# Patient Record
Sex: FEMALE | Race: WHITE | NOT HISPANIC OR LATINO | URBAN - METROPOLITAN AREA
[De-identification: names, ages, dates, MRNs, and addresses within clinical notes are randomized per-mention and may not be internally consistent; named-entity substitution may affect disease eponyms.]

---

## 2017-03-22 ENCOUNTER — CONVERSION ENCOUNTER (OUTPATIENT)
Dept: MAMMOGRAPHY | Facility: HOSPITAL | Age: 77
End: 2017-03-22

## 2020-02-20 ENCOUNTER — HOSPITAL ENCOUNTER (OUTPATIENT)
Dept: SURGERY | Facility: HOSPITAL | Age: 80
Setting detail: HOSPITAL OUTPATIENT SURGERY
Discharge: HOME | End: 2020-02-20
Attending: OPHTHALMOLOGY

## 2020-02-27 ENCOUNTER — HOSPITAL ENCOUNTER (OUTPATIENT)
Dept: SURGERY | Facility: HOSPITAL | Age: 80
Setting detail: HOSPITAL OUTPATIENT SURGERY
Discharge: HOME | End: 2020-02-27
Attending: OPHTHALMOLOGY

## 2024-03-27 ENCOUNTER — OFFICE VISIT (OUTPATIENT)
Dept: NEUROSURGERY | Facility: CLINIC | Age: 84
End: 2024-03-27
Payer: MEDICARE

## 2024-03-27 VITALS
BODY MASS INDEX: 24.53 KG/M2 | SYSTOLIC BLOOD PRESSURE: 140 MMHG | WEIGHT: 147.2 LBS | HEIGHT: 65 IN | DIASTOLIC BLOOD PRESSURE: 86 MMHG

## 2024-03-27 DIAGNOSIS — M51.26 LUMBAR DISC HERNIATION: Primary | ICD-10-CM

## 2024-03-27 DIAGNOSIS — M47.27 OSTEOARTHRITIS OF SPINE WITH RADICULOPATHY, LUMBOSACRAL REGION: ICD-10-CM

## 2024-03-27 RX ORDER — UBIDECARENONE 200 MG
200 CAPSULE ORAL DAILY
COMMUNITY

## 2024-03-27 RX ORDER — BENZONATATE 100 MG/1
100 CAPSULE ORAL 2 TIMES DAILY PRN
COMMUNITY
Start: 2023-11-22

## 2024-03-27 RX ORDER — ATORVASTATIN CALCIUM 40 MG/1
1 TABLET, FILM COATED ORAL
COMMUNITY
Start: 2024-02-05

## 2024-03-27 RX ORDER — LEVOTHYROXINE SODIUM 0.05 MG/1
1 TABLET ORAL DAILY
COMMUNITY
Start: 2023-12-11

## 2024-03-27 RX ORDER — AMLODIPINE BESYLATE 5 MG/1
1 TABLET ORAL DAILY
COMMUNITY
Start: 2024-02-05

## 2024-03-27 RX ORDER — ATENOLOL 25 MG/1
25 TABLET ORAL DAILY
COMMUNITY
Start: 2023-10-23

## 2024-03-27 RX ORDER — ASPIRIN 81 MG/1
81 TABLET ORAL DAILY
COMMUNITY

## 2024-03-27 RX ORDER — ACETAMINOPHEN AND CODEINE PHOSPHATE 300; 30 MG/1; MG/1
1-2 TABLET ORAL EVERY 6 HOURS PRN
COMMUNITY
Start: 2024-02-27

## 2024-03-27 RX ORDER — OMEPRAZOLE 20 MG/1
20 CAPSULE, DELAYED RELEASE ORAL DAILY
COMMUNITY
Start: 2024-02-28

## 2024-03-27 NOTE — PROGRESS NOTES
Chief Complaint  Back Pain    Subjective          Lillian Saucedo who is a 84 y.o. year old female who presents to [facility] for evaluation of lumbar spine.      The patient complains of pain located in the lumbar spine.  Patients states the pain has been present for 2 months.  The pain came on acutely. She does not recall a specific injury that preceded this, possibly lifting a grandson. Pain became quite severe, to the point that she could not get out of the bed. Pain primarily in the right hip and buttock down the leg. Her daughter advised her to go to the ED, she declined. Was given steroids. Symptoms did not improve. She went to the ED. Ultimately had a MRI Lumbar Spine, demonstrating a right sided disc protrusion at L4/5. She was given Tylenol #3 for her pain which provides her benefit.     The pain scale level is 3-4.  The pain does radiate. Dermatomes are located on right Lumbar at: L5..  The pain is waxing/waning and described as sharp, aching, and tingling .  The pain is worse at no particular time of day. Patient states prolonged standing and prolonged walking makes the pain worse.  Patient states rest and pain medication makes the pain better.    Associated Symptoms Include: Numbness, Tingling, and Weakness. Improving. She is utilizing a cane for stabilization.     Conservative Interventions Include: Oral Steroids that were not very effective. and Pain Medications that were somewhat effective.    Was this the result of an injury or accident? : No    History of Previous Spinal Surgery?: No    Nicotine use: non-smoker    BMI: Body mass index is 24.5 kg/m².      Review of Systems   Musculoskeletal:  Positive for arthralgias, back pain, gait problem and myalgias.   Neurological:  Positive for weakness and numbness.   All other systems reviewed and are negative.       Objective   Vital Signs:   /86 (BP Location: Left arm, Patient Position: Sitting)   Ht  "165.1 cm (65\")   Wt 66.8 kg (147 lb 3.2 oz)   BMI 24.50 kg/m²       Physical Exam  Vitals reviewed.   Constitutional:       Appearance: Normal appearance.   Musculoskeletal:      Lumbar back: No tenderness. Negative right straight leg raise test and negative left straight leg raise test.      Right hip: No tenderness. Normal range of motion.      Left hip: No tenderness. Normal range of motion.   Neurological:      Mental Status: She is alert and oriented to person, place, and time.      Motor: Motor strength is normal.     Gait: Gait is intact.      Deep Tendon Reflexes:      Reflex Scores:       Patellar reflexes are 0 on the right side and 2+ on the left side.       Achilles reflexes are 0 on the right side and 2+ on the left side.       Neurologic Exam     Mental Status   Oriented to person, place, and time.   Level of consciousness: alert    Motor Exam   Muscle bulk: normal  Overall muscle tone: normal    Strength   Strength 5/5 throughout.   Right peroneal: 4/5  Right gastroc: 4/5    Sensory Exam   Sensory deficit distribution on right: L5    Gait, Coordination, and Reflexes     Gait  Gait: normal    Reflexes   Right patellar: 0  Left patellar: 2+  Right achilles: 0  Left achilles: 2+  Right ankle clonus: absent  Left ankle clonus: absent       Result Review :       Data reviewed : Radiologic studies MRI Lumbar Spine on 2/21/24 at [facility] personally reviewed. Multilevel degenerative changes. At L4/5 there is a right sided disc protrusion, resulting in severe right foraminal stenosis, mild spinal stenosis.            Assessment and Plan    Diagnoses and all orders for this visit:    1. Lumbar disc herniation (Primary)  -     Ambulatory Referral to Physical Therapy Evaluate and treat; Heat, Electrotherapy; Moist heat; Tens (Home), E-stim; Cross Fiber; Stretching, ROM, Strengthening    2. Osteoarthritis of spine with radiculopathy, lumbosacral region  -     Ambulatory Referral to Physical Therapy Evaluate and " treat; Heat, Electrotherapy; Moist heat; Tens (Home), E-stim; Cross Fiber; Stretching, ROM, Strengthening    Pt presenting for evaluation of acute right sided low back and leg pain with leg weakness starting two months ago. We reviewed her MRI Lumbar Spine, demonstrating multilevel spondylosis with a right sided disc protrusion at L4/5. Symptoms are improving with time. She has notable weakness on exam. Will refer to physical therapy for strengthening. Plan to follow up in 6 weeks to re-evaluate. She is aware to call if pain worsens in the interim.       Follow Up   Return in about 6 weeks (around 5/8/2024).  Patient was given instructions and counseling regarding her condition or for health maintenance advice.

## 2024-04-09 ENCOUNTER — TREATMENT (OUTPATIENT)
Dept: PHYSICAL THERAPY | Facility: CLINIC | Age: 84
End: 2024-04-09
Payer: MEDICARE

## 2024-04-09 DIAGNOSIS — M79.604 RIGHT LEG PAIN: ICD-10-CM

## 2024-04-09 DIAGNOSIS — R29.898 RIGHT LEG WEAKNESS: ICD-10-CM

## 2024-04-09 DIAGNOSIS — M54.50 LUMBAR PAIN: Primary | ICD-10-CM

## 2024-04-09 PROCEDURE — 97161 PT EVAL LOW COMPLEX 20 MIN: CPT | Performed by: PHYSICAL THERAPIST

## 2024-04-09 PROCEDURE — 97110 THERAPEUTIC EXERCISES: CPT | Performed by: PHYSICAL THERAPIST

## 2024-04-09 NOTE — PROGRESS NOTES
"Physical Therapy Initial Evaluation and Plan of Care      Patient: Lillian Saucedo   : 1940  Diagnosis/ICD-10 Code:  Lumbar pain [M54.50]  Referring practitioner: Selene Quinn*  Date of Initial Visit: 2024  Today's Date: 2024  Patient seen for 1 sessions         Visit Diagnoses:    ICD-10-CM ICD-9-CM   1. Lumbar pain  M54.50 724.2   2. Right leg weakness  R29.898 729.89   3. Right leg pain  M79.604 729.5         Subjective Evaluation    History of Present Illness  Mechanism of injury: Mrs. Saucedo comes to [facility] with complaints of LBP.  She is unsure of what started the pain, but it started back in January, but has eased up some.  Origininally, the pain was very severe and she could not get out of bed.  She would not go to the ER immediately, but did end up going.  She was using a walker, but is now using a cane.     She has increased pain and radicular symptoms down the RLE.  The LLE does't really bother her too much unless she overworks it.  She does have weakness throughout the RLE.  She has been walking around the house some without the cane the last few days, but if she stands too long, it can hurt.  If she goes anywhere, she takes the cane.  She doesn't want to fall.  She has her  going up and down and the steps for laundry.  She notes it hurts to drive, RLE feels weak.        She is only taking sponge baths, not stepping over her bath.   She cannot sleep on the R side, but she is sleeping in the bed.     See lumbar MRI 2024.      She has seen APRN with neuro, referred for PT.  She doesn't want to have surgery at her age.             Pain  Current pain ratin  Quality: sharp, dull ache and radiating (tingling, \"feels like water dropping off the leg\")  Relieving factors: rest and medications (Tylenol 3)  Aggravating factors: prolonged positioning, ambulation and standing    Social Support  Lives in: multiple-level home  Lives with: spouse    Hand dominance: " right    Patient Goals  Patient goals for therapy: decreased pain, improved balance, increased motion, increased strength, independence with ADLs/IADLs and return to sport/leisure activities  Patient goal: drive, do steps, clean, laundry           Objective          Active Range of Motion     Additional Active Range of Motion Details  LUMBAR  Flexion: to knees, tightness in HS, no lumbar pain  Extension: to neutral, no pain  R lateral flexion:  to 20 degrees, increases R leg/lumbar pain  L lateral flexion:  to 30, no pain    Radicular symptoms: down the RLE, none at this time, sometimes it feels like water is running down the leg    Tenderness: R QL, lumbar paraspinals, B hip flexors    Posture: L anterior pelvic rotation    Strength/Myotome Testing     Left Hip   Planes of Motion   Flexion: 4-  Abduction: 4-  Adduction: 4    Right Hip   Planes of Motion   Flexion: 3+  Abduction: 4-  Adduction: 4-    Left Knee   Flexion: 4  Extension: 4    Right Knee   Flexion: 4-  Extension: 4-    Left Ankle/Foot   Dorsiflexion: 4  Plantar flexion: 4    Right Ankle/Foot   Dorsiflexion: 3+  Plantar flexion: 4-    Tests       Thoracic   Positive slump.     Lumbar     Right   Positive crossed SLR, passive SLR and quadrant.           Assessment & Plan       Assessment  Impairments: abnormal gait, abnormal or restricted ROM, activity intolerance, impaired balance, impaired physical strength, lacks appropriate home exercise program, pain with function, safety issue and weight-bearing intolerance   Assessment details: Pt presents with limitations, noted below, that impede her ability to perform ADLs, lifting, walking short distances, sitting periods of time, standing long periods, driving, sleeping, and social activities. The skills of a therapist will be required to safely and effectively implement the following treatment plan to restore maximal level of function.        Goals  Plan Goals: LOW BACK PROBLEMS:    1. The patient complains  of low back pain.  LTG 1: 12 weeks:  The patient will report a pain rating of 2/10 or better in order to improve tolerance to activities of daily living and improve sleep quality.  STATUS:  New  STG 1a: 4 weeks:  The patient will report a pain rating of 4/10 or better.  STATUS:  New  TREATMENT:  Therapeutic exercises, manual therapy, aquatic therapy, home exercise instruction, and modalities as needed for pain to include:  electrical stimulation, moist heat, ice, ultrasound, and diathermy.      2. The patient demonstrates weakness of the right hip.  LTG 2: 12 weeks:  The patient will demonstrate 4+ /5 strength for right hip flexion, abduction, and extension in order to improve hip stability.  STATUS:  New  STG 2a: 4 weeks:  The patient will demonstrate 4 /5 strength for right hip flexion, abduction, and extension.  STATUS:  New  STG2b:  4 weeks:  The patient will be independent with home exercises.     STATUS:  New  TREATMENT: Therapeutic exercises, manual therapy, aquatic therapy, home exercise instruction, and modalities as needed for pain to include:  electrical stimulation, moist heat, ice, ultrasound, and diathermy.      3. The patient has gait dysfunction.  LTG 3: 12 weeks:  The patient will ambulate without assistive device, independently, for community distances with minimal limp to the right lower extremity in order to improve mobility and allow patient to perform activities such as grocery shopping with greater ease.  STATUS:  New  TREATMENT: Gait training, aquatic therapy, therapeutic exercise, and home exercise instruction.      4. Mobility: Walking/Moving Around Functional Limitation    LTG 4: 12 weeks:  The patient will demonstrate decreased limitation by achieving a score of 7/45 on the EDVIN.  STATUS:  New  STG 4 a: 4 weeks:  The patient will demonstrate decreased limitation by achieving a score of 12/45 on the EDVIN.    STATUS:  New  TREATMENT:  Manual therapy, therapeutic exercise, home exercise  instruction, and modalities as needed.      5. The patient reports radicular symptoms in the right lower extremity.   LTG 5: 12 weeks:  The patient will report a decrease in radicular symptoms in the right lower extremity by 75%.    STATUS:  New   STG 3a: 4 weeks:  The patient will report a decrease in radicular symptoms in the right lower extremity by 50%.    STATUS:  New   TREATMENT: Manual therapy, therapeutic exercise, home exercise instruction, lumbar traction, and modalities as needed to include: moist heat, electrical stimulation, and ultrasound.    Plan  Therapy options: will be seen for skilled therapy services  Planned modality interventions: cryotherapy, thermotherapy (hydrocollator packs), dry needling, TENS, traction, ultrasound and electrical stimulation/Russian stimulation  Planned therapy interventions: abdominal trunk stabilization, manual therapy, flexibility, functional ROM exercises, gait training, home exercise program, therapeutic activities, stretching, strengthening, spinal/joint mobilization, soft tissue mobilization, postural training, neuromuscular re-education, body mechanics training, ADL retraining, balance/weight-bearing training, motor coordination training and joint mobilization  Frequency: 2x week  Duration in weeks: 12  Treatment plan discussed with: patient  Plan details: Review HEP, update as needed.    Progress with lower back/core strength, trunk control/postural awareness, BLE strengthening, decreased RLE radicular symptoms, increased stamina, decreased tightness, improved ROM/flexibility, education as needed, gait/balance.             History # of Personal Factors and/or Comorbidities: MODERATE (1-2)  Examination of Body System(s): # of elements: MODERATE (3)  Clinical Presentation: STABLE   Clinical Decision Making: LOW     Timed:  Manual Therapy:         mins  30996;  Therapeutic Exercise:    8     mins  88785;     Neuromuscular Ni:        mins  46596;    Therapeutic  Activity:          mins  93398;     Gait Training:           mins  42139;     Ultrasound:          mins  97689;    Canalith Repos           ___  mins  37986      Untimed:  Electrical Stimulation:         mins  48047 ( );  Mechanical Traction:         mins  40841;   Dry Needling:                    mins self pay  Low Eval:                      29     mins  06562;  Medium Eval:                     mins  40429;   High Eval:                          mins  87887       Timed Treatment:   8   mins   Total Treatment:     37   mins    PT SIGNATURE: Lillian Martinez PT, DPT  [place] License: [id number]  Electronically signed by Lillian Martinez PT, 04/09/24, 7:50 AM EDT      Initial Certification    Certification Period: 4/9/2024 thru 7/7/2024  I certify that the therapy services are furnished while this patient is under my care.  The services outlined above are required by this patient, and will be reviewed every 90 days.     PHYSICIAN: Selene Hugo APRN  NPI: [id number]            PHYSICIAN PRINT NAME: ______________________________________________      PHYSICIAN SIGNATURE: ______________________________________________         DATE:________________________________        Please sign and return via fax to [phone].  Thank you, [facility].

## 2024-04-12 ENCOUNTER — TREATMENT (OUTPATIENT)
Dept: PHYSICAL THERAPY | Facility: CLINIC | Age: 84
End: 2024-04-12
Payer: MEDICARE

## 2024-04-12 DIAGNOSIS — M54.50 LUMBAR PAIN: Primary | ICD-10-CM

## 2024-04-12 DIAGNOSIS — R29.898 RIGHT LEG WEAKNESS: ICD-10-CM

## 2024-04-12 DIAGNOSIS — M79.604 RIGHT LEG PAIN: ICD-10-CM

## 2024-04-12 PROCEDURE — 97140 MANUAL THERAPY 1/> REGIONS: CPT | Performed by: PHYSICAL THERAPIST

## 2024-04-12 PROCEDURE — 97110 THERAPEUTIC EXERCISES: CPT | Performed by: PHYSICAL THERAPIST

## 2024-04-12 PROCEDURE — 97530 THERAPEUTIC ACTIVITIES: CPT | Performed by: PHYSICAL THERAPIST

## 2024-04-12 NOTE — PROGRESS NOTES
Physical Therapy Daily Treatment Note      Patient: Lillian Saucedo   : 1940  Referring practitioner: Selene Quinn*  Date of Initial Visit: Type: THERAPY  Noted: 2024  Today's Date: 2024  Patient seen for 2 sessions           Visit Diagnoses:    ICD-10-CM ICD-9-CM   1. Lumbar pain  M54.50 724.2   2. Right leg weakness  R29.898 729.89   3. Right leg pain  M79.604 729.5       Subjective Evaluation    History of Present Illness    Subjective comment: She did ok with the exercises from the HEP.  Her pain is not too much today - she notes she just got up.         Objective   See Exercise, Manual, and Modality Logs for complete treatment.       Assessment & Plan       Assessment  Impairments: abnormal gait, abnormal or restricted ROM, activity intolerance, impaired balance, impaired physical strength, lacks appropriate home exercise program, pain with function, safety issue and weight-bearing intolerance   Assessment details: Reviewed HEP - pt noted patient with PPT, but she was doing them the wrong way.  Corrected her form and she did not have pain during session.  Progressed with lumbar mobility, hip/leg strengthening, and some stamina.  She was fatigued at the end of the session, but did not have pain.          Goals  Plan Goals: LOW BACK PROBLEMS:    1. The patient complains of low back pain.  LTG 1: 12 weeks:  The patient will report a pain rating of 2/10 or better in order to improve tolerance to activities of daily living and improve sleep quality.  STATUS:  Progressing  STG 1a: 4 weeks:  The patient will report a pain rating of 4/10 or better.  STATUS:  Progressing  TREATMENT:  Therapeutic exercises, manual therapy, aquatic therapy, home exercise instruction, and modalities as needed for pain to include:  electrical stimulation, moist heat, ice, ultrasound, and diathermy.      2. The patient demonstrates weakness of the right hip.  LTG 2: 12 weeks:  The patient will demonstrate 4+  /5 strength for right hip flexion, abduction, and extension in order to improve hip stability.  STATUS:  Progressing  STG 2a: 4 weeks:  The patient will demonstrate 4 /5 strength for right hip flexion, abduction, and extension.  STATUS:  Progressing  STG2b:  4 weeks:  The patient will be independent with home exercises.     STATUS:  Progressing  TREATMENT: Therapeutic exercises, manual therapy, aquatic therapy, home exercise instruction, and modalities as needed for pain to include:  electrical stimulation, moist heat, ice, ultrasound, and diathermy.      3. The patient has gait dysfunction.  LTG 3: 12 weeks:  The patient will ambulate without assistive device, independently, for community distances with minimal limp to the right lower extremity in order to improve mobility and allow patient to perform activities such as grocery shopping with greater ease.  STATUS:  Progressing  TREATMENT: Gait training, aquatic therapy, therapeutic exercise, and home exercise instruction.      4. Mobility: Walking/Moving Around Functional Limitation    LTG 4: 12 weeks:  The patient will demonstrate decreased limitation by achieving a score of 7/45 on the EDVIN.  STATUS:  Progressing  STG 4 a: 4 weeks:  The patient will demonstrate decreased limitation by achieving a score of 12/45 on the EDVIN.    STATUS:  Progressing  TREATMENT:  Manual therapy, therapeutic exercise, home exercise instruction, and modalities as needed.      5. The patient reports radicular symptoms in the right lower extremity.   LTG 5: 12 weeks:  The patient will report a decrease in radicular symptoms in the right lower extremity by 75%.    STATUS:  Progressing   STG 3a: 4 weeks:  The patient will report a decrease in radicular symptoms in the right lower extremity by 50%.    STATUS:  Progressing   TREATMENT: Manual therapy, therapeutic exercise, home exercise instruction, lumbar traction, and modalities as needed to include: moist heat, electrical stimulation, and  ultrasound.    Plan  Therapy options: will be seen for skilled therapy services  Planned modality interventions: cryotherapy, thermotherapy (hydrocollator packs), dry needling, TENS, traction, ultrasound and electrical stimulation/Russian stimulation  Planned therapy interventions: abdominal trunk stabilization, manual therapy, flexibility, functional ROM exercises, gait training, home exercise program, therapeutic activities, stretching, strengthening, spinal/joint mobilization, soft tissue mobilization, postural training, neuromuscular re-education, body mechanics training, ADL retraining, balance/weight-bearing training, motor coordination training and joint mobilization  Frequency: 2x week  Duration in weeks: 12  Treatment plan discussed with: patient  Plan details: Progress with lower back/core strength, trunk control/postural awareness, BLE strengthening, decreased RLE radicular symptoms, increased stamina, decreased tightness, improved ROM/flexibility, education as needed, gait/balance.             Progress per Plan of Care and Progress strengthening /stabilization /functional activity           Timed:  Manual Therapy:    8     mins  35961;  Therapeutic Exercise:    17     mins  25137;     Neuromuscular Ni:        mins  52079;    Therapeutic Activity:     13     mins  72422;     Gait Training:           mins  56550;     Ultrasound:          mins  66253;    Canalith Repos           ___  mins  31634      Untimed:  Electrical Stimulation:         mins  44732 ( );  Mechanical Traction:        mins  44772;   Dry Needling:                     mins self pay       Timed Treatment:   38   mins   Total Treatment:     38   mins      Lillian Martinez PT, DPT  [place] License #: [id number]

## 2024-04-17 ENCOUNTER — TREATMENT (OUTPATIENT)
Dept: PHYSICAL THERAPY | Facility: CLINIC | Age: 84
End: 2024-04-17
Payer: MEDICARE

## 2024-04-17 DIAGNOSIS — M54.50 LUMBAR PAIN: Primary | ICD-10-CM

## 2024-04-17 DIAGNOSIS — R29.898 RIGHT LEG WEAKNESS: ICD-10-CM

## 2024-04-17 DIAGNOSIS — M79.604 RIGHT LEG PAIN: ICD-10-CM

## 2024-04-17 PROCEDURE — 97140 MANUAL THERAPY 1/> REGIONS: CPT | Performed by: PHYSICAL THERAPIST

## 2024-04-17 PROCEDURE — 97110 THERAPEUTIC EXERCISES: CPT | Performed by: PHYSICAL THERAPIST

## 2024-04-17 PROCEDURE — 97112 NEUROMUSCULAR REEDUCATION: CPT | Performed by: PHYSICAL THERAPIST

## 2024-04-17 NOTE — PROGRESS NOTES
Physical Therapy Daily Treatment Note      Patient: Lillian Saucedo   : 1940  Referring practitioner: Selene Quinn*  Date of Initial Visit: Type: THERAPY  Noted: 2024  Today's Date: 2024  Patient seen for 3 sessions           Subjective Evaluation    History of Present Illness    Subjective comment: 5/10       Objective   See Exercise, Manual, and Modality Logs for complete treatment.       Assessment & Plan       Assessment  Impairments: abnormal gait, abnormal or restricted ROM, activity intolerance, impaired balance, impaired physical strength, lacks appropriate home exercise program, pain with function, safety issue and weight-bearing intolerance   Assessment details: Pt started new exercises requiring vc and demonstration for safe and effective performance. Core stabilization and strength was the main focus of this tx session. Pt education on core stability and the effects of pelvic instability on the low back and sciatic nerve was given. Numerous areas of soft tissue tightness in the low back and pelvic region were noted. No pain reported at this end of tx.        Goals  Plan Goals: LOW BACK PROBLEMS:    1. The patient complains of low back pain.  LTG 1: 12 weeks:  The patient will report a pain rating of 2/10 or better in order to improve tolerance to activities of daily living and improve sleep quality.  STATUS:  Progressing  STG 1a: 4 weeks:  The patient will report a pain rating of 4/10 or better.  STATUS:  Progressing  TREATMENT:  Therapeutic exercises, manual therapy, aquatic therapy, home exercise instruction, and modalities as needed for pain to include:  electrical stimulation, moist heat, ice, ultrasound, and diathermy.      2. The patient demonstrates weakness of the right hip.  LTG 2: 12 weeks:  The patient will demonstrate 4+ /5 strength for right hip flexion, abduction, and extension in order to improve hip stability.  STATUS:  Progressing  STG 2a: 4 weeks:  The  patient will demonstrate 4 /5 strength for right hip flexion, abduction, and extension.  STATUS:  Progressing  STG2b:  4 weeks:  The patient will be independent with home exercises.     STATUS:  Progressing  TREATMENT: Therapeutic exercises, manual therapy, aquatic therapy, home exercise instruction, and modalities as needed for pain to include:  electrical stimulation, moist heat, ice, ultrasound, and diathermy.      3. The patient has gait dysfunction.  LTG 3: 12 weeks:  The patient will ambulate without assistive device, independently, for community distances with minimal limp to the right lower extremity in order to improve mobility and allow patient to perform activities such as grocery shopping with greater ease.  STATUS:  Progressing  TREATMENT: Gait training, aquatic therapy, therapeutic exercise, and home exercise instruction.      4. Mobility: Walking/Moving Around Functional Limitation    LTG 4: 12 weeks:  The patient will demonstrate decreased limitation by achieving a score of 7/45 on the EDVIN.  STATUS:  Progressing  STG 4 a: 4 weeks:  The patient will demonstrate decreased limitation by achieving a score of 12/45 on the EDIVN.    STATUS:  Progressing  TREATMENT:  Manual therapy, therapeutic exercise, home exercise instruction, and modalities as needed.      5. The patient reports radicular symptoms in the right lower extremity.   LTG 5: 12 weeks:  The patient will report a decrease in radicular symptoms in the right lower extremity by 75%.    STATUS:  Progressing   STG 3a: 4 weeks:  The patient will report a decrease in radicular symptoms in the right lower extremity by 50%.    STATUS:  Progressing   TREATMENT: Manual therapy, therapeutic exercise, home exercise instruction, lumbar traction, and modalities as needed to include: moist heat, electrical stimulation, and ultrasound.    Plan  Therapy options: will be seen for skilled therapy services  Planned modality interventions: cryotherapy, thermotherapy  (hydrocollator packs), dry needling, TENS, traction, ultrasound and electrical stimulation/Russian stimulation  Planned therapy interventions: abdominal trunk stabilization, manual therapy, flexibility, functional ROM exercises, gait training, home exercise program, therapeutic activities, stretching, strengthening, spinal/joint mobilization, soft tissue mobilization, postural training, neuromuscular re-education, body mechanics training, ADL retraining, balance/weight-bearing training, motor coordination training and joint mobilization  Frequency: 2x week  Duration in weeks: 12  Treatment plan discussed with: patient  Plan details: Progress with lower back/core strength, trunk control/postural awareness, BLE strengthening, decreased RLE radicular symptoms, increased stamina, decreased tightness, improved ROM/flexibility, education as needed, gait/balance.             Visit Diagnoses:    ICD-10-CM ICD-9-CM   1. Lumbar pain  M54.50 724.2   2. Right leg weakness  R29.898 729.89   3. Right leg pain  M79.604 729.5       Progress per Plan of Care    Timed:    Therapeutic Exercise:    12     mins  81223;  Manual Therapy:    24     mins  86414;     Neuromuscular Ni:   18    mins  36635;    Therapeutic Activity:          mins  37712;     Gait Training:           mins  81907;     Ultrasound:          mins  78549;      Untimed:  Electrical Stimulation:         mins  88035 ( );  Mechanical Traction:         mins  60401;     Timed Treatment:   54   mins   Total Treatment:     56   mins      Ana Valdez PTA  Physical Therapist Assistant

## 2024-04-19 ENCOUNTER — TREATMENT (OUTPATIENT)
Dept: PHYSICAL THERAPY | Facility: CLINIC | Age: 84
End: 2024-04-19
Payer: MEDICARE

## 2024-04-19 DIAGNOSIS — M79.604 RIGHT LEG PAIN: ICD-10-CM

## 2024-04-19 DIAGNOSIS — M54.50 LUMBAR PAIN: Primary | ICD-10-CM

## 2024-04-19 DIAGNOSIS — R29.898 RIGHT LEG WEAKNESS: ICD-10-CM

## 2024-04-19 PROCEDURE — 97110 THERAPEUTIC EXERCISES: CPT | Performed by: PHYSICAL THERAPIST

## 2024-04-19 PROCEDURE — 97140 MANUAL THERAPY 1/> REGIONS: CPT | Performed by: PHYSICAL THERAPIST

## 2024-04-19 NOTE — PROGRESS NOTES
Physical Therapy Daily Treatment Note      Patient: Lillian Saucedo   : 1940  Referring practitioner: Selene Quinn*  Date of Initial Visit: Type: THERAPY  Noted: 2024  Today's Date: 2024  Patient seen for 4 sessions           Subjective Evaluation    History of Present Illness    Subjective comment: 5/10       Objective   See Exercise, Manual, and Modality Logs for complete treatment.       Assessment & Plan       Assessment  Impairments: abnormal gait, abnormal or restricted ROM, activity intolerance, impaired balance, impaired physical strength, lacks appropriate home exercise program, pain with function, safety issue and weight-bearing intolerance   Assessment details: Pt reviewed the PPT again today and there is still some compensatory muscle usage with the performance. Pt again was educated on the importance of the PPT and to cont her exercises even after DC to allow for maintaining her strength and gains. There is a R pelvic torsion still noted.    Goals  Plan Goals: LOW BACK PROBLEMS:    1. The patient complains of low back pain.  LTG 1: 12 weeks:  The patient will report a pain rating of 2/10 or better in order to improve tolerance to activities of daily living and improve sleep quality.  STATUS:  Progressing  STG 1a: 4 weeks:  The patient will report a pain rating of 4/10 or better.  STATUS:  Progressing  TREATMENT:  Therapeutic exercises, manual therapy, aquatic therapy, home exercise instruction, and modalities as needed for pain to include:  electrical stimulation, moist heat, ice, ultrasound, and diathermy.      2. The patient demonstrates weakness of the right hip.  LTG 2: 12 weeks:  The patient will demonstrate 4+ /5 strength for right hip flexion, abduction, and extension in order to improve hip stability.  STATUS:  Progressing  STG 2a: 4 weeks:  The patient will demonstrate 4 /5 strength for right hip flexion, abduction, and extension.  STATUS:  Progressing  STG2b:  4  weeks:  The patient will be independent with home exercises.     STATUS:  Progressing  TREATMENT: Therapeutic exercises, manual therapy, aquatic therapy, home exercise instruction, and modalities as needed for pain to include:  electrical stimulation, moist heat, ice, ultrasound, and diathermy.      3. The patient has gait dysfunction.  LTG 3: 12 weeks:  The patient will ambulate without assistive device, independently, for community distances with minimal limp to the right lower extremity in order to improve mobility and allow patient to perform activities such as grocery shopping with greater ease.  STATUS:  Progressing  TREATMENT: Gait training, aquatic therapy, therapeutic exercise, and home exercise instruction.      4. Mobility: Walking/Moving Around Functional Limitation    LTG 4: 12 weeks:  The patient will demonstrate decreased limitation by achieving a score of 7/45 on the EDVIN.  STATUS:  Progressing  STG 4 a: 4 weeks:  The patient will demonstrate decreased limitation by achieving a score of 12/45 on the EDVIN.    STATUS:  Progressing  TREATMENT:  Manual therapy, therapeutic exercise, home exercise instruction, and modalities as needed.      5. The patient reports radicular symptoms in the right lower extremity.   LTG 5: 12 weeks:  The patient will report a decrease in radicular symptoms in the right lower extremity by 75%.    STATUS:  Progressing   STG 3a: 4 weeks:  The patient will report a decrease in radicular symptoms in the right lower extremity by 50%.    STATUS:  Progressing   TREATMENT: Manual therapy, therapeutic exercise, home exercise instruction, lumbar traction, and modalities as needed to include: moist heat, electrical stimulation, and ultrasound.    Plan  Therapy options: will be seen for skilled therapy services  Planned modality interventions: cryotherapy, thermotherapy (hydrocollator packs), dry needling, TENS, traction, ultrasound and electrical stimulation/Russian stimulation  Planned  therapy interventions: abdominal trunk stabilization, manual therapy, flexibility, functional ROM exercises, gait training, home exercise program, therapeutic activities, stretching, strengthening, spinal/joint mobilization, soft tissue mobilization, postural training, neuromuscular re-education, body mechanics training, ADL retraining, balance/weight-bearing training, motor coordination training and joint mobilization  Frequency: 2x week  Duration in weeks: 12  Treatment plan discussed with: patient  Plan details: Progress with lower back/core strength, trunk control/postural awareness, BLE strengthening, decreased RLE radicular symptoms, increased stamina, decreased tightness, improved ROM/flexibility, education as needed, gait/balance.           Visit Diagnoses:    ICD-10-CM ICD-9-CM   1. Lumbar pain  M54.50 724.2   2. Right leg weakness  R29.898 729.89   3. Right leg pain  M79.604 729.5       Progress per Plan of Care    Timed:    Therapeutic Exercise:    20     mins  07750;  Manual Therapy:    8     mins  25676;     Neuromuscular Ni:       mins  61626;    Therapeutic Activity:          mins  13025;     Gait Training:           mins  89879;     Ultrasound:          mins  90116;      Untimed:  Electrical Stimulation:         mins  13248 ( );  Mechanical Traction:         mins  34455;     Timed Treatment:   28   mins   Total Treatment:     30   mins      Ana Valdez PTA  Physical Therapist Assistant

## 2024-04-24 ENCOUNTER — TREATMENT (OUTPATIENT)
Dept: PHYSICAL THERAPY | Facility: CLINIC | Age: 84
End: 2024-04-24
Payer: MEDICARE

## 2024-04-24 DIAGNOSIS — M79.604 RIGHT LEG PAIN: ICD-10-CM

## 2024-04-24 DIAGNOSIS — M54.50 LUMBAR PAIN: Primary | ICD-10-CM

## 2024-04-24 DIAGNOSIS — R29.898 RIGHT LEG WEAKNESS: ICD-10-CM

## 2024-04-24 PROCEDURE — 97530 THERAPEUTIC ACTIVITIES: CPT | Performed by: PHYSICAL THERAPIST

## 2024-04-24 PROCEDURE — 97110 THERAPEUTIC EXERCISES: CPT | Performed by: PHYSICAL THERAPIST

## 2024-04-24 NOTE — PROGRESS NOTES
"Physical Therapy Daily Treatment Note      Patient: Lillian Saucedo   : 1940  Referring practitioner: Selene Quinn*  Date of Initial Visit: Type: THERAPY  Noted: 2024  Today's Date: 2024  Patient seen for 5 sessions           Subjective Evaluation    History of Present Illness    Subjective comment: \"If I overdue it my back hurts but otherwise it (pain) is mild.\"       Objective   See Exercise, Manual, and Modality Logs for complete treatment.       Assessment & Plan       Assessment  Impairments: abnormal gait, abnormal or restricted ROM, activity intolerance, impaired balance, impaired physical strength, lacks appropriate home exercise program, pain with function, safety issue and weight-bearing intolerance   Assessment details: Pt reports going up and down stairs at home and being able to tolerate it well. Pt also reports doing her HEP. There cont to be a R pelvic torsion at this visit. Pt required vc to decrease compensatory muscle use with the PPT. There is tightness in the R hip flexion with tenderness upon palpation.    Goals  Plan Goals: LOW BACK PROBLEMS:    1. The patient complains of low back pain.  LTG 1: 12 weeks:  The patient will report a pain rating of 2/10 or better in order to improve tolerance to activities of daily living and improve sleep quality.  STATUS:  Progressing  STG 1a: 4 weeks:  The patient will report a pain rating of 4/10 or better.  STATUS:  Progressing  TREATMENT:  Therapeutic exercises, manual therapy, aquatic therapy, home exercise instruction, and modalities as needed for pain to include:  electrical stimulation, moist heat, ice, ultrasound, and diathermy.      2. The patient demonstrates weakness of the right hip.  LTG 2: 12 weeks:  The patient will demonstrate 4+ /5 strength for right hip flexion, abduction, and extension in order to improve hip stability.  STATUS:  Progressing  STG 2a: 4 weeks:  The patient will demonstrate 4 /5 strength for " right hip flexion, abduction, and extension.  STATUS:  Progressing  STG2b:  4 weeks:  The patient will be independent with home exercises.     STATUS:  Progressing  TREATMENT: Therapeutic exercises, manual therapy, aquatic therapy, home exercise instruction, and modalities as needed for pain to include:  electrical stimulation, moist heat, ice, ultrasound, and diathermy.      3. The patient has gait dysfunction.  LTG 3: 12 weeks:  The patient will ambulate without assistive device, independently, for community distances with minimal limp to the right lower extremity in order to improve mobility and allow patient to perform activities such as grocery shopping with greater ease.  STATUS:  Progressing  TREATMENT: Gait training, aquatic therapy, therapeutic exercise, and home exercise instruction.      4. Mobility: Walking/Moving Around Functional Limitation    LTG 4: 12 weeks:  The patient will demonstrate decreased limitation by achieving a score of 7/45 on the EDVIN.  STATUS:  Progressing  STG 4 a: 4 weeks:  The patient will demonstrate decreased limitation by achieving a score of 12/45 on the EDVIN.    STATUS:  Progressing  TREATMENT:  Manual therapy, therapeutic exercise, home exercise instruction, and modalities as needed.      5. The patient reports radicular symptoms in the right lower extremity.   LTG 5: 12 weeks:  The patient will report a decrease in radicular symptoms in the right lower extremity by 75%.    STATUS:  Progressing   STG 3a: 4 weeks:  The patient will report a decrease in radicular symptoms in the right lower extremity by 50%.    STATUS:  Progressing   TREATMENT: Manual therapy, therapeutic exercise, home exercise instruction, lumbar traction, and modalities as needed to include: moist heat, electrical stimulation, and ultrasound.    Plan  Therapy options: will be seen for skilled therapy services  Planned modality interventions: cryotherapy, thermotherapy (hydrocollator packs), dry needling, TENS,  traction, ultrasound and electrical stimulation/Russian stimulation  Planned therapy interventions: abdominal trunk stabilization, manual therapy, flexibility, functional ROM exercises, gait training, home exercise program, therapeutic activities, stretching, strengthening, spinal/joint mobilization, soft tissue mobilization, postural training, neuromuscular re-education, body mechanics training, ADL retraining, balance/weight-bearing training, motor coordination training and joint mobilization  Frequency: 2x week  Duration in weeks: 12  Treatment plan discussed with: patient  Plan details: Progress with lower back/core strength, trunk control/postural awareness, BLE strengthening, decreased RLE radicular symptoms, increased stamina, decreased tightness, improved ROM/flexibility, education as needed, gait/balance.             Visit Diagnoses:    ICD-10-CM ICD-9-CM   1. Lumbar pain  M54.50 724.2   2. Right leg weakness  R29.898 729.89   3. Right leg pain  M79.604 729.5       Progress per Plan of Care    Timed:    Therapeutic Exercise:    10     mins  72963;  Manual Therapy:    8     mins  16408;     Neuromuscular Ni:       mins  52161;    Therapeutic Activity:     12     mins  45789;     Gait Training:           mins  29033;     Ultrasound:          mins  57855;      Untimed:  Electrical Stimulation:         mins  89208 ( );  Mechanical Traction:         mins  80814;     Timed Treatment:   30   mins   Total Treatment:     32   mins      Ana Valdez PTA  Physical Therapist Assistant

## 2024-04-30 ENCOUNTER — TREATMENT (OUTPATIENT)
Dept: PHYSICAL THERAPY | Facility: CLINIC | Age: 84
End: 2024-04-30
Payer: MEDICARE

## 2024-04-30 DIAGNOSIS — M79.604 RIGHT LEG PAIN: ICD-10-CM

## 2024-04-30 DIAGNOSIS — R29.898 RIGHT LEG WEAKNESS: ICD-10-CM

## 2024-04-30 DIAGNOSIS — M54.50 LUMBAR PAIN: Primary | ICD-10-CM

## 2024-04-30 PROCEDURE — 97110 THERAPEUTIC EXERCISES: CPT | Performed by: PHYSICAL THERAPIST

## 2024-04-30 PROCEDURE — 97530 THERAPEUTIC ACTIVITIES: CPT | Performed by: PHYSICAL THERAPIST

## 2024-04-30 NOTE — PROGRESS NOTES
Physical Therapy Daily Treatment Note      Patient: Lillian Saucedo   : 1940  Referring practitioner: Selene Quinn*  Date of Initial Visit: Type: THERAPY  Noted: 2024  Today's Date: 2024  Patient seen for 6 sessions           Visit Diagnoses:    ICD-10-CM ICD-9-CM   1. Lumbar pain  M54.50 724.2   2. Right leg weakness  R29.898 729.89   3. Right leg pain  M79.604 729.5       Subjective Evaluation    History of Present Illness  Mechanism of injury: Mrs. Saucedo has had increased pain in the back and R leg since last week.  Her  notes that she had R lower leg swelling last night, but went away this morning.     The pain has not let us.  It is sporadic throughout the R leg - quad, IT band, and calf.  It doesn't feel like her prior sciatica like pain, this is different she notes.                Objective   See Exercise, Manual, and Modality Logs for complete treatment.       Assessment & Plan       Assessment  Impairments: abnormal gait, abnormal or restricted ROM, activity intolerance, impaired balance, impaired physical strength, lacks appropriate home exercise program, pain with function, safety issue and weight-bearing intolerance   Assessment details: Increased soreness and tender to touch along the R quad, thigh, and lower back.  She does have an anteriorly rotated pelvis on the R.  Added in very gentle stretches.  Poor tolerance to manual along the R leg today.  Her pain is sporadic throughout the R leg throughout the session - mainly in the R hip flexor/quad region and the R calf/shin.    Goals  Plan Goals: LOW BACK PROBLEMS:    1. The patient complains of low back pain.  LTG 1: 12 weeks:  The patient will report a pain rating of 2/10 or better in order to improve tolerance to activities of daily living and improve sleep quality.  STATUS:  Progressing  STG 1a: 4 weeks:  The patient will report a pain rating of 4/10 or better.  STATUS:  Progressing  TREATMENT:  Therapeutic  exercises, manual therapy, aquatic therapy, home exercise instruction, and modalities as needed for pain to include:  electrical stimulation, moist heat, ice, ultrasound, and diathermy.      2. The patient demonstrates weakness of the right hip.  LTG 2: 12 weeks:  The patient will demonstrate 4+ /5 strength for right hip flexion, abduction, and extension in order to improve hip stability.  STATUS:  Progressing  STG 2a: 4 weeks:  The patient will demonstrate 4 /5 strength for right hip flexion, abduction, and extension.  STATUS:  Progressing  STG2b:  4 weeks:  The patient will be independent with home exercises.     STATUS:  Progressing  TREATMENT: Therapeutic exercises, manual therapy, aquatic therapy, home exercise instruction, and modalities as needed for pain to include:  electrical stimulation, moist heat, ice, ultrasound, and diathermy.      3. The patient has gait dysfunction.  LTG 3: 12 weeks:  The patient will ambulate without assistive device, independently, for community distances with minimal limp to the right lower extremity in order to improve mobility and allow patient to perform activities such as grocery shopping with greater ease.  STATUS:  Progressing  TREATMENT: Gait training, aquatic therapy, therapeutic exercise, and home exercise instruction.      4. Mobility: Walking/Moving Around Functional Limitation    LTG 4: 12 weeks:  The patient will demonstrate decreased limitation by achieving a score of 7/45 on the EDVIN.  STATUS:  Progressing  STG 4 a: 4 weeks:  The patient will demonstrate decreased limitation by achieving a score of 12/45 on the EDVIN.    STATUS:  Progressing  TREATMENT:  Manual therapy, therapeutic exercise, home exercise instruction, and modalities as needed.      5. The patient reports radicular symptoms in the right lower extremity.   LTG 5: 12 weeks:  The patient will report a decrease in radicular symptoms in the right lower extremity by 75%.    STATUS:  Progressing   STG 3a: 4  weeks:  The patient will report a decrease in radicular symptoms in the right lower extremity by 50%.    STATUS:  Progressing   TREATMENT: Manual therapy, therapeutic exercise, home exercise instruction, lumbar traction, and modalities as needed to include: moist heat, electrical stimulation, and ultrasound.    Plan  Therapy options: will be seen for skilled therapy services  Planned modality interventions: cryotherapy, thermotherapy (hydrocollator packs), dry needling, TENS, traction, ultrasound and electrical stimulation/Russian stimulation  Planned therapy interventions: abdominal trunk stabilization, manual therapy, flexibility, functional ROM exercises, gait training, home exercise program, therapeutic activities, stretching, strengthening, spinal/joint mobilization, soft tissue mobilization, postural training, neuromuscular re-education, body mechanics training, ADL retraining, balance/weight-bearing training, motor coordination training and joint mobilization  Frequency: 2x week  Duration in weeks: 12  Treatment plan discussed with: patient  Plan details: Progress with lower back/core strength, trunk control/postural awareness, BLE strengthening, decreased RLE radicular symptoms, decreased tightness, improved ROM/flexibility            Progress per Plan of Care and Progress strengthening /stabilization /functional activity           Timed:  Manual Therapy:    6     mins  70990;  Therapeutic Exercise:    8     mins  57579;     Neuromuscular Ni:        mins  34233;    Therapeutic Activity:     10     mins  51178;     Gait Training:           mins  30667;     Ultrasound:          mins  30165;    [employer]           ___  mins  78649      Untimed:  Electrical Stimulation:         mins  42910 ( );  Mechanical Traction:         mins  88975;   Dry Needling:                     mins self pay       Timed Treatment:   24   mins   Total Treatment:     29   mins      Lillian Martinez PT, DPT  [place] License #:  [id number]

## 2024-05-03 ENCOUNTER — TREATMENT (OUTPATIENT)
Dept: PHYSICAL THERAPY | Facility: CLINIC | Age: 84
End: 2024-05-03
Payer: MEDICARE

## 2024-05-03 DIAGNOSIS — M79.604 RIGHT LEG PAIN: ICD-10-CM

## 2024-05-03 DIAGNOSIS — R29.898 RIGHT LEG WEAKNESS: ICD-10-CM

## 2024-05-03 DIAGNOSIS — M54.50 LUMBAR PAIN: Primary | ICD-10-CM

## 2024-05-03 PROCEDURE — 97140 MANUAL THERAPY 1/> REGIONS: CPT | Performed by: PHYSICAL THERAPIST

## 2024-05-03 PROCEDURE — 97110 THERAPEUTIC EXERCISES: CPT | Performed by: PHYSICAL THERAPIST

## 2024-05-03 PROCEDURE — 97530 THERAPEUTIC ACTIVITIES: CPT | Performed by: PHYSICAL THERAPIST

## 2024-05-03 NOTE — PROGRESS NOTES
Physical Therapy Daily Treatment Note      Patient: Lillian Saucedo   : 1940  Referring practitioner: Selene Quinn*  Date of Initial Visit: Type: THERAPY  Noted: 2024  Today's Date: 5/3/2024  Patient seen for 7 sessions           Visit Diagnoses:    ICD-10-CM ICD-9-CM   1. Lumbar pain  M54.50 724.2   2. Right leg weakness  R29.898 729.89   3. Right leg pain  M79.604 729.5       Subjective Evaluation    History of Present Illness    Subjective comment: The R leg is still bothering her, mainly more R knee and down, some in the thigh.  The back is a little better.  She notes she just wants to be able to walk.  She is going back to neuro on Wednesday next week.         Objective   See Exercise, Manual, and Modality Logs for complete treatment.       Assessment & Plan       Assessment  Impairments: abnormal gait, abnormal or restricted ROM, activity intolerance, impaired balance, impaired physical strength, lacks appropriate home exercise program, pain with function, safety issue and weight-bearing intolerance   Assessment details: Fair tolerance to session today, increased pain throughout the R leg.  She was having increased pain with most activities, even though education to only go to the point of stretch.  She continued to have poor/fair tolerance to manual along the R leg today.  Her pain is sporadic throughout the R leg throughout the session - mainly in the R hip flexor/quad region and the R calf/shin.  The back does feel better itself today.  She goes to neuro on Wednesday, then we have a follow up on Friday.     Goals  Plan Goals: LOW BACK PROBLEMS:    1. The patient complains of low back pain.  LTG 1: 12 weeks:  The patient will report a pain rating of 2/10 or better in order to improve tolerance to activities of daily living and improve sleep quality.  STATUS:  Progressing  STG 1a: 4 weeks:  The patient will report a pain rating of 4/10 or better.  STATUS:  Progressing  TREATMENT:   Therapeutic exercises, manual therapy, aquatic therapy, home exercise instruction, and modalities as needed for pain to include:  electrical stimulation, moist heat, ice, ultrasound, and diathermy.      2. The patient demonstrates weakness of the right hip.  LTG 2: 12 weeks:  The patient will demonstrate 4+ /5 strength for right hip flexion, abduction, and extension in order to improve hip stability.  STATUS:  Progressing  STG 2a: 4 weeks:  The patient will demonstrate 4 /5 strength for right hip flexion, abduction, and extension.  STATUS:  Progressing  STG2b:  4 weeks:  The patient will be independent with home exercises.     STATUS:  Progressing  TREATMENT: Therapeutic exercises, manual therapy, aquatic therapy, home exercise instruction, and modalities as needed for pain to include:  electrical stimulation, moist heat, ice, ultrasound, and diathermy.      3. The patient has gait dysfunction.  LTG 3: 12 weeks:  The patient will ambulate without assistive device, independently, for community distances with minimal limp to the right lower extremity in order to improve mobility and allow patient to perform activities such as grocery shopping with greater ease.  STATUS:  Progressing  TREATMENT: Gait training, aquatic therapy, therapeutic exercise, and home exercise instruction.      4. Mobility: Walking/Moving Around Functional Limitation    LTG 4: 12 weeks:  The patient will demonstrate decreased limitation by achieving a score of 7/45 on the EDVIN.  STATUS:  Progressing  STG 4 a: 4 weeks:  The patient will demonstrate decreased limitation by achieving a score of 12/45 on the EDVIN.    STATUS:  Progressing  TREATMENT:  Manual therapy, therapeutic exercise, home exercise instruction, and modalities as needed.      5. The patient reports radicular symptoms in the right lower extremity.   LTG 5: 12 weeks:  The patient will report a decrease in radicular symptoms in the right lower extremity by 75%.    STATUS:   Progressing   STG 3a: 4 weeks:  The patient will report a decrease in radicular symptoms in the right lower extremity by 50%.    STATUS:  Progressing   TREATMENT: Manual therapy, therapeutic exercise, home exercise instruction, lumbar traction, and modalities as needed to include: moist heat, electrical stimulation, and ultrasound.    Plan  Therapy options: will be seen for skilled therapy services  Planned modality interventions: cryotherapy, thermotherapy (hydrocollator packs), dry needling, TENS, traction, ultrasound and electrical stimulation/Russian stimulation  Planned therapy interventions: abdominal trunk stabilization, manual therapy, flexibility, functional ROM exercises, gait training, home exercise program, therapeutic activities, stretching, strengthening, spinal/joint mobilization, soft tissue mobilization, postural training, neuromuscular re-education, body mechanics training, ADL retraining, balance/weight-bearing training, motor coordination training and joint mobilization  Frequency: 2x week  Duration in weeks: 12  Treatment plan discussed with: patient  Plan details: Progress with lower back/core strength, trunk control/postural awareness, BLE strengthening, decreased RLE radicular symptoms, decreased tightness, improved ROM/flexibility            Progress per Plan of Care and Progress strengthening /stabilization /functional activity           Timed:  Manual Therapy:    13     mins  31518;  Therapeutic Exercise:    15     mins  53168;     Neuromuscular Ni:        mins  86673;    Therapeutic Activity:     10     mins  69386;     Gait Training:           mins  78138;     Ultrasound:          mins  31098;    Canalith Repos           ___  mins  39505      Untimed:  Electrical Stimulation:         mins  83838 ( );  Mechanical Traction:         mins  48508;   Dry Needling:                     mins self pay       Timed Treatment:   38   mins   Total Treatment:     38   mins      Lillian Martinez PT,  DPT  [place] License #: [id number]

## 2024-05-08 ENCOUNTER — OFFICE VISIT (OUTPATIENT)
Dept: NEUROSURGERY | Facility: CLINIC | Age: 84
End: 2024-05-08
Payer: MEDICARE

## 2024-05-08 VITALS
BODY MASS INDEX: 24.87 KG/M2 | SYSTOLIC BLOOD PRESSURE: 136 MMHG | DIASTOLIC BLOOD PRESSURE: 84 MMHG | HEIGHT: 65 IN | WEIGHT: 149.3 LBS

## 2024-05-08 DIAGNOSIS — M51.26 LUMBAR DISC HERNIATION: ICD-10-CM

## 2024-05-08 DIAGNOSIS — M47.27 OSTEOARTHRITIS OF SPINE WITH RADICULOPATHY, LUMBOSACRAL REGION: Primary | ICD-10-CM

## 2024-05-08 PROCEDURE — 1160F RVW MEDS BY RX/DR IN RCRD: CPT | Performed by: NURSE PRACTITIONER

## 2024-05-08 PROCEDURE — 1159F MED LIST DOCD IN RCRD: CPT | Performed by: NURSE PRACTITIONER

## 2024-05-08 PROCEDURE — 99213 OFFICE O/P EST LOW 20 MIN: CPT | Performed by: NURSE PRACTITIONER

## 2024-05-10 ENCOUNTER — TREATMENT (OUTPATIENT)
Dept: PHYSICAL THERAPY | Facility: CLINIC | Age: 84
End: 2024-05-10
Payer: MEDICARE

## 2024-05-10 DIAGNOSIS — M79.604 RIGHT LEG PAIN: ICD-10-CM

## 2024-05-10 DIAGNOSIS — M54.50 LUMBAR PAIN: Primary | ICD-10-CM

## 2024-05-10 DIAGNOSIS — R29.898 RIGHT LEG WEAKNESS: ICD-10-CM

## 2024-05-10 PROCEDURE — 97140 MANUAL THERAPY 1/> REGIONS: CPT | Performed by: PHYSICAL THERAPIST

## 2024-05-10 PROCEDURE — 97110 THERAPEUTIC EXERCISES: CPT | Performed by: PHYSICAL THERAPIST

## 2024-05-10 PROCEDURE — 97112 NEUROMUSCULAR REEDUCATION: CPT | Performed by: PHYSICAL THERAPIST

## 2024-05-14 ENCOUNTER — TREATMENT (OUTPATIENT)
Dept: PHYSICAL THERAPY | Facility: CLINIC | Age: 84
End: 2024-05-14
Payer: MEDICARE

## 2024-05-14 DIAGNOSIS — M79.604 RIGHT LEG PAIN: ICD-10-CM

## 2024-05-14 DIAGNOSIS — M54.50 LUMBAR PAIN: Primary | ICD-10-CM

## 2024-05-14 DIAGNOSIS — R29.898 RIGHT LEG WEAKNESS: ICD-10-CM

## 2024-05-14 PROCEDURE — 97140 MANUAL THERAPY 1/> REGIONS: CPT | Performed by: PHYSICAL THERAPIST

## 2024-05-14 PROCEDURE — 97110 THERAPEUTIC EXERCISES: CPT | Performed by: PHYSICAL THERAPIST

## 2024-05-14 PROCEDURE — 97530 THERAPEUTIC ACTIVITIES: CPT | Performed by: PHYSICAL THERAPIST

## 2024-05-14 NOTE — PROGRESS NOTES
Physical Therapy Daily Treatment Note      Patient: Lillian Saucedo   : 1940  Referring practitioner: Selene Quinn*  Date of Initial Visit: Type: THERAPY  Noted: 2024  Today's Date: 2024  Patient seen for 9 sessions           Visit Diagnoses:    ICD-10-CM ICD-9-CM   1. Lumbar pain  M54.50 724.2   2. Right leg weakness  R29.898 729.89   3. Right leg pain  M79.604 729.5       Subjective Evaluation    History of Present Illness    Subjective comment: She feels better today.  She did stairs over the weekend and walked around some outside.         Objective   See Exercise, Manual, and Modality Logs for complete treatment.       Assessment & Plan       Assessment  Impairments: abnormal gait, abnormal or restricted ROM, activity intolerance, impaired balance, impaired physical strength, lacks appropriate home exercise program, pain with function, safety issue and weight-bearing intolerance   Assessment details: Mrs. Saucedo looked much better today.  She was walking without her SPC, more upright and more confidence noted.  The pain levels were very minimal.  She continues to have pain in the R shin, not like it was running down the R leg last week.  She does feel better today.  Stairs were performed in clinic today, reciprocal motion both directions using L HRA.     Goals  Plan Goals: LOW BACK PROBLEMS:    1. The patient complains of low back pain.  LTG 1: 12 weeks:  The patient will report a pain rating of 2/10 or better in order to improve tolerance to activities of daily living and improve sleep quality.  STATUS:  Progressing  STG 1a: 4 weeks:  The patient will report a pain rating of 4/10 or better.  STATUS:  MET  TREATMENT:  Therapeutic exercises, manual therapy, aquatic therapy, home exercise instruction, and modalities as needed for pain to include:  electrical stimulation, moist heat, ice, ultrasound, and diathermy.      2. The patient demonstrates weakness of the right hip.  LTG 2: 12  weeks:  The patient will demonstrate 4+ /5 strength for right hip flexion, abduction, and extension in order to improve hip stability.  STATUS:  Progressing  STG 2a: 4 weeks:  The patient will demonstrate 4 /5 strength for right hip flexion, abduction, and extension.  STATUS:  Progressing  STG2b:  4 weeks:  The patient will be independent with home exercises.     STATUS:  Progressing  TREATMENT: Therapeutic exercises, manual therapy, aquatic therapy, home exercise instruction, and modalities as needed for pain to include:  electrical stimulation, moist heat, ice, ultrasound, and diathermy.      3. The patient has gait dysfunction.  LTG 3: 12 weeks:  The patient will ambulate without assistive device, independently, for community distances with minimal limp to the right lower extremity in order to improve mobility and allow patient to perform activities such as grocery shopping with greater ease.  STATUS:  MET  TREATMENT: Gait training, aquatic therapy, therapeutic exercise, and home exercise instruction.      4. Mobility: Walking/Moving Around Functional Limitation    LTG 4: 12 weeks:  The patient will demonstrate decreased limitation by achieving a score of 7/45 on the EDVIN.  STATUS:  Progressing  STG 4 a: 4 weeks:  The patient will demonstrate decreased limitation by achieving a score of 12/45 on the EDVIN.    STATUS:  Progressing  TREATMENT:  Manual therapy, therapeutic exercise, home exercise instruction, and modalities as needed.      5. The patient reports radicular symptoms in the right lower extremity.   LTG 5: 12 weeks:  The patient will report a decrease in radicular symptoms in the right lower extremity by 75%.    STATUS:  Progressing   STG 3a: 4 weeks:  The patient will report a decrease in radicular symptoms in the right lower extremity by 50%.    STATUS:  MET   TREATMENT: Manual therapy, therapeutic exercise, home exercise instruction, lumbar traction, and modalities as needed to include: moist heat,  electrical stimulation, and ultrasound.    Plan  Therapy options: will be seen for skilled therapy services  Planned modality interventions: cryotherapy, thermotherapy (hydrocollator packs), dry needling, TENS, traction, ultrasound and electrical stimulation/Russian stimulation  Planned therapy interventions: abdominal trunk stabilization, manual therapy, flexibility, functional ROM exercises, gait training, home exercise program, therapeutic activities, stretching, strengthening, spinal/joint mobilization, soft tissue mobilization, postural training, neuromuscular re-education, body mechanics training, ADL retraining, balance/weight-bearing training, motor coordination training and joint mobilization  Frequency: 2x week  Duration in weeks: 12  Treatment plan discussed with: patient  Plan details: Progress with lower back/core strength, trunk control/postural awareness, BLE strengthening, decreased RLE radicular symptoms, decreased tightness, improved ROM/flexibility            Progress per Plan of Care and Progress strengthening /stabilization /functional activity           Timed:  Manual Therapy:    8     mins  55152;  Therapeutic Exercise:    12     mins  61788;     Neuromuscular Ni:        mins  28719;    Therapeutic Activity:     18     mins  63506;     Gait Training:           mins  27381;     Ultrasound:          mins  19989;    Canalith Repos           ___  mins  69930      Untimed:  Electrical Stimulation:         mins  25792 ( );  Mechanical Traction:         mins  52841;   Dry Needling:                     mins self pay       Timed Treatment:   38   mins   Total Treatment:     38   mins      Lillian Martinez PT, ARTUROT  [place] License #: [id number]

## 2024-05-17 ENCOUNTER — TREATMENT (OUTPATIENT)
Dept: PHYSICAL THERAPY | Facility: CLINIC | Age: 84
End: 2024-05-17
Payer: MEDICARE

## 2024-05-17 DIAGNOSIS — R29.898 RIGHT LEG WEAKNESS: ICD-10-CM

## 2024-05-17 DIAGNOSIS — M54.50 LUMBAR PAIN: Primary | ICD-10-CM

## 2024-05-17 DIAGNOSIS — M79.604 RIGHT LEG PAIN: ICD-10-CM

## 2024-05-17 PROCEDURE — 97110 THERAPEUTIC EXERCISES: CPT | Performed by: PHYSICAL THERAPIST

## 2024-05-17 PROCEDURE — 97530 THERAPEUTIC ACTIVITIES: CPT | Performed by: PHYSICAL THERAPIST

## 2024-05-17 NOTE — PROGRESS NOTES
Physical Therapy Daily Treatment Note      Patient: Lillian Saucedo   : 1940  Referring practitioner: Selene Quinn*  Date of Initial Visit: Type: THERAPY  Noted: 2024  Today's Date: 2024  Patient seen for 10 sessions           Visit Diagnoses:    ICD-10-CM ICD-9-CM   1. Lumbar pain  M54.50 724.2   2. Right leg weakness  R29.898 729.89   3. Right leg pain  M79.604 729.5       Subjective Evaluation    History of Present Illness    Subjective comment: No pain today.  She started driving.  She is going up/down the steps some.  She is walking outside some.  She sees PCP next week.         Objective   See Exercise, Manual, and Modality Logs for complete treatment.       Assessment & Plan       Assessment  Impairments: abnormal gait, abnormal or restricted ROM, activity intolerance, impaired balance, impaired physical strength, lacks appropriate home exercise program, pain with function, safety issue and weight-bearing intolerance   Assessment details: Mrs. Saucedo looked much better today.  She was walking without her SPC today, able to drive now, and doing stairs.  She is still giving herself rest breaks throughout the day.  She is not having pain in the back today.  She feels ready to d/c and do HEP.  She will be discharged from PT, follow up with PCP next week.     Goals  Plan Goals: LOW BACK PROBLEMS:    1. The patient complains of low back pain.  LTG 1: 12 weeks:  The patient will report a pain rating of 2/10 or better in order to improve tolerance to activities of daily living and improve sleep quality.  STATUS:  MET  STG 1a: 4 weeks:  The patient will report a pain rating of 4/10 or better.  STATUS:  MET  TREATMENT:  Therapeutic exercises, manual therapy, aquatic therapy, home exercise instruction, and modalities as needed for pain to include:  electrical stimulation, moist heat, ice, ultrasound, and diathermy.      2. The patient demonstrates weakness of the right hip.  LTG 2: 12  weeks:  The patient will demonstrate 4+ /5 strength for right hip flexion, abduction, and extension in order to improve hip stability.  STATUS:  Progressing  STG 2a: 4 weeks:  The patient will demonstrate 4 /5 strength for right hip flexion, abduction, and extension.  STATUS:  Progressing  STG2b:  4 weeks:  The patient will be independent with home exercises.     STATUS:  MET  TREATMENT: Therapeutic exercises, manual therapy, aquatic therapy, home exercise instruction, and modalities as needed for pain to include:  electrical stimulation, moist heat, ice, ultrasound, and diathermy.      3. The patient has gait dysfunction.  LTG 3: 12 weeks:  The patient will ambulate without assistive device, independently, for community distances with minimal limp to the right lower extremity in order to improve mobility and allow patient to perform activities such as grocery shopping with greater ease.  STATUS:  MET  TREATMENT: Gait training, aquatic therapy, therapeutic exercise, and home exercise instruction.      4. Mobility: Walking/Moving Around Functional Limitation    LTG 4: 12 weeks:  The patient will demonstrate decreased limitation by achieving a score of 7/45 on the EDVIN.  STATUS:  Progressing  STG 4 a: 4 weeks:  The patient will demonstrate decreased limitation by achieving a score of 12/45 on the EDVIN.    STATUS:  Progressing  TREATMENT:  Manual therapy, therapeutic exercise, home exercise instruction, and modalities as needed.      5. The patient reports radicular symptoms in the right lower extremity.   LTG 5: 12 weeks:  The patient will report a decrease in radicular symptoms in the right lower extremity by 75%.    STATUS:  Progressing   STG 3a: 4 weeks:  The patient will report a decrease in radicular symptoms in the right lower extremity by 50%.    STATUS:  MET   TREATMENT: Manual therapy, therapeutic exercise, home exercise instruction, lumbar traction, and modalities as needed to include: moist heat, electrical  stimulation, and ultrasound.    Plan  Therapy options: will be seen for skilled therapy services  Planned modality interventions: cryotherapy, thermotherapy (hydrocollator packs), dry needling, TENS, traction, ultrasound and electrical stimulation/Russian stimulation  Planned therapy interventions: abdominal trunk stabilization, manual therapy, flexibility, functional ROM exercises, gait training, home exercise program, therapeutic activities, stretching, strengthening, spinal/joint mobilization, soft tissue mobilization, postural training, neuromuscular re-education, body mechanics training, ADL retraining, balance/weight-bearing training, motor coordination training and joint mobilization  Frequency: 2x week  Duration in weeks: 12  Treatment plan discussed with: patient  Plan details: Discharge, continue with HEP            Progress per Plan of Care and Progress strengthening /stabilization /functional activity           Timed:  Manual Therapy:         mins  41093;  Therapeutic Exercise:    15     mins  61253;     Neuromuscular Ni:        mins  70510;    Therapeutic Activity:     8     mins  91534;     Gait Training:           mins  46405;     Ultrasound:          mins  79306;    Canalith Repos           ___  mins  36578      Untimed:  Electrical Stimulation:         mins  69584 ( );  Mechanical Traction:         mins  82222;   Dry Needling:                     mins self pay       Timed Treatment:   23   mins   Total Treatment:     23   mins      Lillian Martinez PT, DPT  [place] License #: [id number]